# Patient Record
Sex: MALE | Race: WHITE | NOT HISPANIC OR LATINO | Employment: FULL TIME | URBAN - METROPOLITAN AREA
[De-identification: names, ages, dates, MRNs, and addresses within clinical notes are randomized per-mention and may not be internally consistent; named-entity substitution may affect disease eponyms.]

---

## 2019-07-16 ENCOUNTER — TELEPHONE (OUTPATIENT)
Dept: PAIN MEDICINE | Facility: CLINIC | Age: 49
End: 2019-07-16
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
## 2019-07-18 ENCOUNTER — CONSULT (OUTPATIENT)
Dept: PAIN MEDICINE | Facility: CLINIC | Age: 49
End: 2019-07-18
Payer: COMMERCIAL

## 2019-07-18 VITALS
HEART RATE: 52 BPM | WEIGHT: 281 LBS | BODY MASS INDEX: 34.94 KG/M2 | DIASTOLIC BLOOD PRESSURE: 74 MMHG | HEIGHT: 75 IN | SYSTOLIC BLOOD PRESSURE: 120 MMHG

## 2019-07-18 DIAGNOSIS — M54.2 NECK PAIN: ICD-10-CM

## 2019-07-18 DIAGNOSIS — F07.81 POST CONCUSSIVE SYNDROME: ICD-10-CM

## 2019-07-18 DIAGNOSIS — M54.12 CERVICAL RADICULOPATHY: Primary | ICD-10-CM

## 2019-07-18 PROCEDURE — 99204 OFFICE O/P NEW MOD 45 MIN: CPT | Performed by: ANESTHESIOLOGY

## 2019-07-18 RX ORDER — PREDNISONE 10 MG/1
TABLET ORAL
Qty: 21 TABLET | Refills: 0 | Status: SHIPPED | OUTPATIENT
Start: 2019-07-18 | End: 2019-08-22
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
## 2019-07-25 ENCOUNTER — TELEPHONE (OUTPATIENT)
Dept: PAIN MEDICINE | Facility: CLINIC | Age: 49
End: 2019-07-25
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
## 2019-07-25 NOTE — TELEPHONE ENCOUNTER
Patient has made an appointment for an MRI on 8/2/19 at [facility]  Please ensure the authorization is put through his AUTO

## 2019-08-02 ENCOUNTER — HOSPITAL ENCOUNTER (OUTPATIENT)
Dept: MRI IMAGING | Facility: HOSPITAL | Age: 49
Discharge: HOME | End: 2019-08-02
Attending: ANESTHESIOLOGY
Payer: COMMERCIAL

## 2019-08-02 DIAGNOSIS — M54.12 CERVICAL RADICULOPATHY: ICD-10-CM

## 2019-08-02 PROCEDURE — 72141 MRI NECK SPINE W/O DYE: CPT

## 2019-08-12 ENCOUNTER — TELEPHONE (OUTPATIENT)
Dept: PAIN MEDICINE | Facility: CLINIC | Age: 49
End: 2019-08-12

## 2019-08-12 NOTE — TELEPHONE ENCOUNTER
----- Message from Eufemia Adler DO sent at 8/10/2019  2:40 PM EDT -----  MRI cervical spine:Small left foraminal disc protrusion C5-6    Mild bilateral foraminal stenosis, small left foraminal disc protrusion C6-7    If pain shoots pain is still interfering with daily living activities would recommend cervical epidural steroid injection x2 diagnosis cervical disc herniation, cervical radiculitis

## 2019-08-22 ENCOUNTER — TELEPHONE (OUTPATIENT)
Dept: PAIN MEDICINE | Facility: CLINIC | Age: 49
End: 2019-08-22

## 2019-08-22 ENCOUNTER — OFFICE VISIT (OUTPATIENT)
Dept: PAIN MEDICINE | Facility: CLINIC | Age: 49
End: 2019-08-22
Payer: COMMERCIAL

## 2019-08-22 VITALS
WEIGHT: 284 LBS | DIASTOLIC BLOOD PRESSURE: 82 MMHG | SYSTOLIC BLOOD PRESSURE: 128 MMHG | HEART RATE: 54 BPM | HEIGHT: 75 IN | BODY MASS INDEX: 35.31 KG/M2

## 2019-08-22 DIAGNOSIS — M54.2 NECK PAIN: Primary | ICD-10-CM

## 2019-08-22 DIAGNOSIS — M50.222 HERNIATED NUCLEUS PULPOSUS, C5-6 LEFT: ICD-10-CM

## 2019-08-22 DIAGNOSIS — M50.223 HERNIATED NUCLEUS PULPOSUS, C6-7 LEFT: ICD-10-CM

## 2019-08-22 DIAGNOSIS — M54.12 CERVICAL RADICULOPATHY: ICD-10-CM

## 2019-08-22 PROCEDURE — 99214 OFFICE O/P EST MOD 30 MIN: CPT | Performed by: NURSE PRACTITIONER

## 2019-08-22 NOTE — TELEPHONE ENCOUNTER
Call from patient  # [phone]  Fax# [phone] Attention: Marco Antonio Lepe    Patient is requesting documentation to provide to his  regarding the followin-Pt has 2 herniated disc's and the nature of it  2-Course of injections for the neck were recommended, if that fails surgery would be the next course of action

## 2019-08-22 NOTE — PATIENT INSTRUCTIONS
Epidural Steroid Injection   AMBULATORY CARE:   What you need to know about an epidural steroid injection (HOA):  An HOA is a procedure to inject steroid medicine into the epidural space  The epidural space is between your spinal cord and vertebrae  Steroids reduce inflammation and fluid buildup in your spine that may be causing pain  You may be given pain medicine along with the steroids  How to prepare for an HOA:  Your healthcare provider will talk to you about how to prepare for your procedure  He will tell you what medicines to take or not take on the day of your procedure  You may need to stop taking blood thinners or other medicines several days before your procedure  You may need to adjust any diabetes medicine you take on the day of your procedure  Steroid medicine can increase your blood sugar level  What will happen during an HOA:   · You will be given medicine to numb the procedure area  You will be awake for the procedure, but you will not feel pain  You may also be given medicine to help you relax during the procedure  Contrast liquid will be used to help your healthcare provider see the area better  Tell the healthcare provider if you have ever had an allergic reaction to contrast liquid  · Your healthcare provider may place the needle into your neck area, middle of your back, or tailbone area  He may inject the medicine next to the nerves that are causing your pain  He may instead inject the medicine into a larger area of the epidural space  This helps the medicine spread to more nerves  Your healthcare provider will use a fluoroscope to help guide the needle to the right place  A fluoroscope is a type of x-ray  After the procedure, a bandage will be placed over the injection site to prevent infection  Risks of an HOA:  You may have temporary or permanent nerve damage or paralysis  You may have bleeding or develop a serious infection, such as meningitis (swelling of the brain coverings)  An abscess may also develop  You may need surgery to fix the abscess  You may have a seizure, anxiety, or trouble sleeping  If you are a man, you may have temporary erectile dysfunction (not able to have an erection)  Care for your wound as directed: You may remove the bandage before you go to bed the day of your procedure  You may take a shower, but do not take a bath for at least 24 hours  Self-care:   · Do not drive,  use machines, or do strenuous activity for 24 hours after your procedure or as directed  · Continue other treatments  as directed  Steroid injections alone will not control your pain  The injections are meant to be used with other treatments, such as physical therapy  Seek care immediately if:   · Blood soaks through your bandage  · Your wound is red, swollen, or draining pus  · You have a fever or chills, severe back pain, and the procedure area is sensitive to the touch  · You have a seizure  · You have trouble moving your legs  · You have weakness or numbness in your legs  · You cannot control when you urinate or have a bowel movement  Contact your healthcare provider if:   · You have nausea or are vomiting  · Your face or neck is red for a few days and you feel warm  · You have more pain than you had before the procedure  · You have swelling in your hands or feet  · You have questions or concerns about your condition or care  Follow up with your healthcare provider as directed:  Write down your questions so you remember to ask them during your visits  © 2017 [employer] Information is for End User's use only and may not be sold, redistributed or otherwise used for commercial purposes  All illustrations and images included in CareNotes® are the copyrighted property of [employer]  or [employer] Thacker  The above information is an  only  It is not intended as medical advice for individual conditions or treatments  Talk to your doctor, nurse or pharmacist before following any medical regimen to see if it is safe and effective for you      MEDICATIONS: Juanid GOMEZ

## 2019-08-22 NOTE — TELEPHONE ENCOUNTER
LMOM to cb  Provided cb number and office hours  Will advise pt, this office will provide information available in the pt's chart via the

## 2019-08-22 NOTE — PROGRESS NOTES
Assessment:  1  Neck pain    2  Herniated nucleus pulposus, C5-6 left    3  Herniated nucleus pulposus, C6-7 left    4  Cervical radiculopathy        Plan:  While the patient was in the office today, I did have a thorough conversation with the patient regarding his current pain symptoms, recent MRI findings, and treatment plan options  I explained to the patient that at this point we feel there is definitely significant inflammatory component and that he may benefit from a cervical epidural steroid injection directed towards the left with Dr Yamileth Reno  I did explain to the patient that depending on the results of the injection, it may need to be repeated, however, we would see how he does from the 1st injection and proceed from there as appropriate  Complete risks and benefits including bleeding, infection, tissue reaction, nerve injury and allergic reaction were discussed  The approach was demonstrated using models and literature was provided  However, the patient want to take some time to review the literature that I provided today and discussed with his wife before he made any decisions regarding an injection, but understands that if he decides he would like to proceed with the injection in the future, he should call our office and we can get him scheduled  The patient was agreeable and verbalized an understanding  I also discussed with the patient that another option would be to consider an neuropathic medications such as gabapentin, Lyrica, Cymbalta, and or Nortriptyline  I did briefly review with the patient the kinds of medication these medicines are, how they were, and how they require a titration process that is specific to each individual   However, the patient reported he want to think about the medications as well and is not sure that medication is an option he would like to pursue at this time      I discussed with the patient that at this point time he can followup with our office on an as-needed basis  I did review the patient that if his pain symptoms should change, worsen, and/or if he would experience any new symptoms he would like to be evaluated for, he should give our office a call  The patient was agreeable and verbalized an understanding  I attest that I have spent at least 25 minutes face to face with the patient and that at least 50% of the time was educating and/or discussing the patient's symptoms and treatment plan options until the patient was satisfied with the plan  History of Present Illness: The patient is a 51 y o  male last seen on 7/18/19 who presents for a follow up office visit in regards to neck and left arm pain secondary to herniated discs with radiculopathy  The patient currently reports that since his last office visit his left-sided greater than right neck and upper extremity radicular symptoms and numbness has continued to worsen and presents today to discuss the results of his most recent MRI of the cervical spine which did show too small disc herniations push more towards left at C5-C6 and C6-C7 levels with mild bilateral foraminal stenosis noted  Since his last office visit the patient has also proceeded with the ordered titrating dose of oral prednisone with some improvement while he was on the prednisone, however, there has not been any significant overall change or improvement  He presents today to discuss his treatment options  I have personally reviewed and/or updated the patient's past medical history, past surgical history, family history, social history, current medications, allergies, and vital signs today  Review of Systems:    Review of Systems   Respiratory: Negative for shortness of breath  Cardiovascular: Negative for chest pain  Gastrointestinal: Negative for constipation, diarrhea, nausea and vomiting  Musculoskeletal: Positive for joint swelling (joint stiffness)   Negative for arthralgias, gait problem and myalgias  Skin: Negative for rash  Neurological: Positive for dizziness  Negative for seizures and weakness  All other systems reviewed and are negative  History reviewed  No pertinent past medical history  History reviewed  No pertinent surgical history  History reviewed  No pertinent family history  Social History     Occupational History    Not on file   Tobacco Use    Smoking status: Never Smoker    Smokeless tobacco: Never Used   Substance and Sexual Activity    Alcohol use: Never     Frequency: Never    Drug use: Never    Sexual activity: Never       No current outpatient medications on file  No Known Allergies    Physical Exam:    /82 (BP Location: Right arm, Patient Position: Sitting, Cuff Size: Large)   Pulse (!) 54   Ht 6' 3" (1 905 m)   Wt 129 kg (284 lb)   BMI 35 50 kg/m²     Constitutional:normal, well developed, well nourished, alert, in no distress and non-toxic and no overt pain behavior  Eyes:anicteric  HEENT:grossly intact  Neck:supple, symmetric, trachea midline and no masses   Pulmonary:even and unlabored  Cardiovascular:No edema or pitting edema present  Skin:Normal without rashes or lesions and well hydrated  Psychiatric:Mood and affect appropriate  Neurologic:Cranial Nerves II-XII grossly intact  Musculoskeletal:normal      Imaging  No orders to display         No orders of the defined types were placed in this encounter

## 2019-08-26 ENCOUNTER — TELEPHONE (OUTPATIENT)
Dept: PAIN MEDICINE | Facility: MEDICAL CENTER | Age: 49
End: 2019-08-26

## 2019-08-26 NOTE — TELEPHONE ENCOUNTER
Saira Brown 34 minutes ago (8:02 AM)         Pt is calling requesting to schedule an injection     Pt can be reached at [phone]            Has pt called back for his results and obtained info on the procedure? Then I will call to schedule pt

## 2019-08-28 NOTE — TELEPHONE ENCOUNTER
S/w patient, wrong fax # prior, however pt confirmed prior fax # is a secure line, no longer used ([facility])    Correct Fax# [phone] Attention: Mercedes Davis

## 2019-08-28 NOTE — TELEPHONE ENCOUNTER
Pt scheduled tentatively the 1st procedure for 9/6/2019, pt will wait to scheduled repeat as he might need to change the 1st date  Pt aware to verify his Auto benefits as well as his [employer] provided proc/dx codes to pt to see if any co-insurance/copays apply

## 2019-08-29 NOTE — TELEPHONE ENCOUNTER
rcvd call from pt to change procedure to later the same day, advised pt we are booked at the moment if anything opens will call pt  Pt was offered of days but decided to keep the original date of 9/6/2016  Pt check if we spoke to his insurance advised pt per Adilia Ivory with [employer] his claim was open and Brandi Abraham was still pending authorization  Advised to call his insurances to verify benefits

## 2019-08-29 NOTE — TELEPHONE ENCOUNTER
Spoke with patient and advised that records will be sent to fax # provided   Original call was routed to clinical pool

## 2019-09-06 ENCOUNTER — HOSPITAL ENCOUNTER (OUTPATIENT)
Dept: RADIOLOGY | Facility: CLINIC | Age: 49
Discharge: HOME | End: 2019-09-06
Payer: COMMERCIAL

## 2019-09-06 VITALS
RESPIRATION RATE: 20 BRPM | TEMPERATURE: 98.6 F | HEART RATE: 52 BPM | SYSTOLIC BLOOD PRESSURE: 127 MMHG | OXYGEN SATURATION: 96 % | DIASTOLIC BLOOD PRESSURE: 78 MMHG

## 2019-09-06 DIAGNOSIS — M54.12 CERVICAL RADICULITIS: ICD-10-CM

## 2019-09-06 DIAGNOSIS — M50.123 CERVICAL DISC DISORDER AT C6-C7 LEVEL WITH RADICULOPATHY: ICD-10-CM

## 2019-09-06 PROCEDURE — 62321 NJX INTERLAMINAR CRV/THRC: CPT | Performed by: ANESTHESIOLOGY

## 2019-09-06 RX ORDER — LIDOCAINE HYDROCHLORIDE 10 MG/ML
5 INJECTION, SOLUTION EPIDURAL; INFILTRATION; INTRACAUDAL; PERINEURAL ONCE
Status: COMPLETED | OUTPATIENT
Start: 2019-09-06 | End: 2019-09-06

## 2019-09-06 RX ORDER — METHYLPREDNISOLONE ACETATE 80 MG/ML
160 INJECTION, SUSPENSION INTRA-ARTICULAR; INTRALESIONAL; INTRAMUSCULAR; PARENTERAL; SOFT TISSUE ONCE
Status: COMPLETED | OUTPATIENT
Start: 2019-09-06 | End: 2019-09-06

## 2019-09-06 RX ADMIN — IOHEXOL 1 ML: 300 INJECTION, SOLUTION INTRAVENOUS at 08:17

## 2019-09-06 RX ADMIN — LIDOCAINE HYDROCHLORIDE 3 ML: 10 INJECTION, SOLUTION EPIDURAL; INFILTRATION; INTRACAUDAL; PERINEURAL at 08:16

## 2019-09-06 RX ADMIN — METHYLPREDNISOLONE ACETATE 160 MG: 80 INJECTION, SUSPENSION INTRA-ARTICULAR; INTRALESIONAL; INTRAMUSCULAR; SOFT TISSUE at 08:17

## 2019-09-06 NOTE — DISCHARGE INSTRUCTIONS
Epidural Steroid Injection   WHAT YOU NEED TO KNOW:   An epidural steroid injection (HOA) is a procedure to inject steroid medicine into the epidural space  The epidural space is between your spinal cord and vertebrae  Steroids reduce inflammation and fluid buildup in your spine that may be causing pain  You may be given pain medicine along with the steroids  ACTIVITY  · Do not drive or operate machinery today  · No strenuous activity today - bending, lifting, etc   · You may resume normal activites starting tomorrow - start slowly and as tolerated  · You may shower today, but no tub baths or hot tubs  · You may have numbness for several hours from the local anesthetic  Please use caution and common sense, especially with weight-bearing activities  CARE OF THE INJECTION SITE  · If you have soreness or pain, apply ice to the area today (20 minutes on/20 minutes off)  · Starting tomorrow, you may use warm, moist heat or ice if needed  · You may have an increase or change in your discomfort for 36-48 hours after your treatment  · Apply ice and continue with any pain medication you have been prescribed  · [facility]ify [facility] if you have any of the following: redness, drainage, swelling, headache, stiff neck or fever above 100°F     SPECIAL INSTRUCTIONS  · Our office will contact you in approximately 7 days for a progress report  MEDICATIONS  · Continue to take all routine medications  · Our office may have instructed you to hold some medications  If you have a problem specifically related to your procedure, please call our office at [phone]  Problems not related to your procedure should be directed to your primary care physician

## 2019-09-06 NOTE — H&P
History of Present Illness: The patient is a 51 y o  male who presents with complaints of neck and arm pain    Patient Active Problem List   Diagnosis    Neck pain    Herniated nucleus pulposus, C5-6 left    Herniated nucleus pulposus, C6-7 left    Cervical radiculopathy       No past medical history on file  No past surgical history on file  No current outpatient medications on file  Current Facility-Administered Medications:     iohexol (OMNIPAQUE) 300 mg/mL injection 50 mL, 50 mL, Epidural, Once, Rick Slater DO    lidocaine (PF) (XYLOCAINE-MPF) 1 % injection 5 mL, 5 mL, Other, Once, Rick Slater DO    methylPREDNISolone acetate (DEPO-MEDROL) injection 160 mg, 160 mg, Epidural, Once, Rick Slater DO    No Known Allergies    Physical Exam:   General: Awake, Alert, Oriented x 3, Mood and affect appropriate  Respiratory: Respirations even and unlabored  Cardiovascular: Peripheral pulses intact; no edema  Musculoskeletal Exam:  Decreased range of motion cervical spine    ASA Score: II         Assessment:   1  Cervical disc disorder at C6-C7 level with radiculopathy    2   Cervical radiculitis        Plan: WHIT #1

## 2019-09-13 ENCOUNTER — TELEPHONE (OUTPATIENT)
Dept: PAIN MEDICINE | Facility: CLINIC | Age: 49
End: 2019-09-13

## 2019-09-16 ENCOUNTER — TELEPHONE (OUTPATIENT)
Dept: PAIN MEDICINE | Facility: CLINIC | Age: 49
End: 2019-09-16

## 2019-10-24 ENCOUNTER — HOSPITAL ENCOUNTER (OUTPATIENT)
Dept: RADIOLOGY | Facility: CLINIC | Age: 49
Discharge: HOME | End: 2019-10-24
Attending: ANESTHESIOLOGY | Admitting: ANESTHESIOLOGY
Payer: COMMERCIAL

## 2019-10-24 VITALS
HEART RATE: 68 BPM | SYSTOLIC BLOOD PRESSURE: 143 MMHG | TEMPERATURE: 98.3 F | OXYGEN SATURATION: 95 % | DIASTOLIC BLOOD PRESSURE: 84 MMHG | RESPIRATION RATE: 18 BRPM

## 2019-10-24 DIAGNOSIS — M54.12 CERVICAL RADICULITIS: ICD-10-CM

## 2019-10-24 DIAGNOSIS — M50.123 DISORDER OF INTERVERTEBRAL DISC AT C6-C7 LEVEL WITH RADICULOPATHY: ICD-10-CM

## 2019-10-24 PROCEDURE — 62321 NJX INTERLAMINAR CRV/THRC: CPT | Performed by: ANESTHESIOLOGY

## 2019-10-24 RX ORDER — LIDOCAINE HYDROCHLORIDE 10 MG/ML
5 INJECTION, SOLUTION EPIDURAL; INFILTRATION; INTRACAUDAL; PERINEURAL ONCE
Status: COMPLETED | OUTPATIENT
Start: 2019-10-24 | End: 2019-10-24

## 2019-10-24 RX ORDER — METHYLPREDNISOLONE ACETATE 80 MG/ML
160 INJECTION, SUSPENSION INTRA-ARTICULAR; INTRALESIONAL; INTRAMUSCULAR; PARENTERAL; SOFT TISSUE ONCE
Status: COMPLETED | OUTPATIENT
Start: 2019-10-24 | End: 2019-10-24

## 2019-10-24 RX ADMIN — LIDOCAINE HYDROCHLORIDE 3 ML: 10 INJECTION, SOLUTION EPIDURAL; INFILTRATION; INTRACAUDAL; PERINEURAL at 10:39

## 2019-10-24 RX ADMIN — IOHEXOL 1 ML: 300 INJECTION, SOLUTION INTRAVENOUS at 10:41

## 2019-10-24 RX ADMIN — METHYLPREDNISOLONE ACETATE 160 MG: 80 INJECTION, SUSPENSION INTRA-ARTICULAR; INTRALESIONAL; INTRAMUSCULAR; SOFT TISSUE at 10:41

## 2019-10-24 NOTE — DISCHARGE INSTR - LAB
Epidural Steroid Injection   WHAT YOU NEED TO KNOW:   An epidural steroid injection (HOA) is a procedure to inject steroid medicine into the epidural space  The epidural space is between your spinal cord and vertebrae  Steroids reduce inflammation and fluid buildup in your spine that may be causing pain  You may be given pain medicine along with the steroids  ACTIVITY  · Do not drive or operate machinery today  · No strenuous activity today - bending, lifting, etc   · You may resume normal activites starting tomorrow - start slowly and as tolerated  · You may shower today, but no tub baths or hot tubs  · You may have numbness for several hours from the local anesthetic  Please use caution and common sense, especially with weight-bearing activities  CARE OF THE INJECTION SITE  · If you have soreness or pain, apply ice to the area today (20 minutes on/20 minutes off)  · Starting tomorrow, you may use warm, moist heat or ice if needed  · You may have an increase or change in your discomfort for 36-48 hours after your treatment  · Apply ice and continue with any pain medication you have been prescribed  · Notify [facility] if you have any of the following: redness, drainage, swelling, headache, stiff neck or fever above 100°F     SPECIAL INSTRUCTIONS  · Our office will contact you in approximately 7 days for a progress report  MEDICATIONS  · Continue to take all routine medications  · Our office may have instructed you to hold some medications  If you have a problem specifically related to your procedure, please call our office at [phone]  Problems not related to your procedure should be directed to your primary care physician

## 2019-10-24 NOTE — H&P
History of Present Illness: The patient is a 51 y o  male who presents with complaints of neck pain  Patient Active Problem List   Diagnosis    Neck pain    Herniated nucleus pulposus, C5-6 left    Herniated nucleus pulposus, C6-7 left    Cervical radiculitis    Cervical disc disorder at C6-C7 level with radiculopathy       No past medical history on file  No past surgical history on file  No current outpatient medications on file  Current Facility-Administered Medications:     iohexol (OMNIPAQUE) 300 mg/mL injection 50 mL, 50 mL, Epidural, Once, Rick Slater DO    lidocaine (PF) (XYLOCAINE-MPF) 1 % injection 5 mL, 5 mL, Other, Once, Rick Slater DO    methylPREDNISolone acetate (DEPO-MEDROL) injection 160 mg, 160 mg, Epidural, Once, Brisa Samayoa DO    No Known Allergies    Physical Exam:   Vitals:    10/24/19 1032   BP: 132/82   Pulse: 68   Resp: 18   Temp: 98 3 °F (36 8 °C)   SpO2: 96%     General: Awake, Alert, Oriented x 3, Mood and affect appropriate  Respiratory: Respirations even and unlabored  Cardiovascular: Peripheral pulses intact; no edema  Musculoskeletal Exam:  Decreased range of motion cervical spine    ASA Score: II    Patient/Chart Verification  Patient ID Verified: Verbal  ID Band Applied: No  Consents Confirmed: Procedural, To be obtained in the Pre-Procedure area  H&P( within 30 days) Verified: To be obtained in the Pre-Procedure area  Allergies Reviewed: Yes  Anticoag/NSAID held?: No  Currently on antibiotics?: No    Assessment:   1  Disorder of intervertebral disc at C6-C7 level with radiculopathy    2   Cervical radiculitis        Plan: WHIT #2

## 2019-10-31 ENCOUNTER — TELEPHONE (OUTPATIENT)
Dept: PAIN MEDICINE | Facility: CLINIC | Age: 49
End: 2019-10-31

## 2019-12-06 NOTE — TELEPHONE ENCOUNTER
Please call patient-his last procedure was in Oct?  How long has he had headache? Did he try calling before?

## 2019-12-06 NOTE — TELEPHONE ENCOUNTER
S/w the patient today and he stated since the WHIT he has had a mostly constant HA that is mostly in the front of his forehead and radiates up to his hairline  Inquired about previously having attempting to contact him and he stated he is super busy with work and his career and he has not been able to [place] [facility]  Inquired to see if it is positional and he stated that he does feel better when he lays down to sleep but it increases during the day and it is starting to affect his ADL's  He stated it feels like a brain freeze  It is relieved with taking OTC Nsaid's  He is also c/o pain in LB toward the bottom of his spine  S/w SL and he would like him to start a medrol dose pack and move up his appointment c/ DG for evaluation  S/w the patient and reviewed and he was agreeable in taking the medrol dose pack and instructions reviewed  Notified [facility] and medrol dose pack called in

## 2019-12-06 NOTE — TELEPHONE ENCOUNTER
Patient states she or he has 0% of improvement & 5/10 pain level  Patient has  constant headache, spike in the base of spine pain that he didn't have & he states is intoloerable   He would like to discuss his status with a nurse; please advise, hardy    Call back# [phone]

## 2019-12-06 NOTE — TELEPHONE ENCOUNTER
Please call patient and change next ovs to an earlier one c/ DG per SL  Thanks  May double book per SL

## 2020-02-17 ENCOUNTER — TELEPHONE (OUTPATIENT)
Dept: PAIN MEDICINE | Facility: CLINIC | Age: 50
End: 2020-02-17

## 2020-02-17 DIAGNOSIS — M50.223 HERNIATED NUCLEUS PULPOSUS, C6-7 LEFT: ICD-10-CM

## 2020-02-17 DIAGNOSIS — M50.222 HERNIATED NUCLEUS PULPOSUS, C5-6 LEFT: ICD-10-CM

## 2020-02-17 DIAGNOSIS — M54.12 CERVICAL RADICULITIS: ICD-10-CM

## 2020-02-17 DIAGNOSIS — M54.2 NECK PAIN: Primary | ICD-10-CM

## 2020-02-17 NOTE — TELEPHONE ENCOUNTER
Patient called in to schedule his next injection with Oni   Please advise thx      Patient contact # [phone]

## 2020-02-17 NOTE — TELEPHONE ENCOUNTER
Can you please call the patient and see what he is talking about? Did the previous injections help his pain? How much did it help? Where is his pain?  Etc?

## 2020-02-19 NOTE — TELEPHONE ENCOUNTER
Can you please call the patient and schedule him for a repeat WHIT to the Left with Dr Bobo Moffett  I put the order in  He will then need a 4 week f/u OV with me for 15 mins to discuss options and re-group after the injection  Thank you

## 2020-02-19 NOTE — TELEPHONE ENCOUNTER
Proc carol #3 scheduled for 3/2/2020, with a 4 wk f/u post on 4/2/2020 for re-eval, reviewed all pre-procedural instructions with pt and pt verbalized understanding

## 2020-02-19 NOTE — TELEPHONE ENCOUNTER
S/w pt, he said that he was looking to schedule another WHIT, last one Oct 2019  Pt said the pain has come back and the injection has completely worn off  Pt said his pain is in the same place, neck L>R, L arm and L hand is numb  Pt said the pain is getting worse than ever, starting as soon as he wakes up  RN asked pt is the injections helped in the past, pt said they do temporarily  He wants to do an injection and down the line talk about more permanent fixes  OK to schedule?

## 2020-03-02 ENCOUNTER — HOSPITAL ENCOUNTER (OUTPATIENT)
Dept: RADIOLOGY | Facility: CLINIC | Age: 50
Discharge: HOME | End: 2020-03-02
Admitting: ANESTHESIOLOGY
Payer: COMMERCIAL

## 2020-03-02 VITALS
DIASTOLIC BLOOD PRESSURE: 88 MMHG | HEART RATE: 66 BPM | RESPIRATION RATE: 22 BRPM | OXYGEN SATURATION: 95 % | SYSTOLIC BLOOD PRESSURE: 145 MMHG | TEMPERATURE: 97.8 F

## 2020-03-02 DIAGNOSIS — M50.223 HERNIATED NUCLEUS PULPOSUS, C6-7 LEFT: ICD-10-CM

## 2020-03-02 DIAGNOSIS — M54.2 NECK PAIN: ICD-10-CM

## 2020-03-02 DIAGNOSIS — M50.222 HERNIATED NUCLEUS PULPOSUS, C5-6 LEFT: ICD-10-CM

## 2020-03-02 DIAGNOSIS — M54.12 CERVICAL RADICULITIS: ICD-10-CM

## 2020-03-02 PROCEDURE — 62321 NJX INTERLAMINAR CRV/THRC: CPT | Performed by: ANESTHESIOLOGY

## 2020-03-02 RX ORDER — METHYLPREDNISOLONE ACETATE 80 MG/ML
160 INJECTION, SUSPENSION INTRA-ARTICULAR; INTRALESIONAL; INTRAMUSCULAR; PARENTERAL; SOFT TISSUE ONCE
Status: COMPLETED | OUTPATIENT
Start: 2020-03-02 | End: 2020-03-02

## 2020-03-02 RX ORDER — LIDOCAINE HYDROCHLORIDE 10 MG/ML
5 INJECTION, SOLUTION EPIDURAL; INFILTRATION; INTRACAUDAL; PERINEURAL ONCE
Status: COMPLETED | OUTPATIENT
Start: 2020-03-02 | End: 2020-03-02

## 2020-03-02 RX ADMIN — METHYLPREDNISOLONE ACETATE 160 MG: 80 INJECTION, SUSPENSION INTRA-ARTICULAR; INTRALESIONAL; INTRAMUSCULAR; SOFT TISSUE at 09:35

## 2020-03-02 RX ADMIN — LIDOCAINE HYDROCHLORIDE 3 ML: 10 INJECTION, SOLUTION EPIDURAL; INFILTRATION; INTRACAUDAL; PERINEURAL at 09:34

## 2020-03-02 RX ADMIN — IOHEXOL 1 ML: 300 INJECTION, SOLUTION INTRAVENOUS at 09:35

## 2020-03-02 NOTE — DISCHARGE INSTRUCTIONS
Epidural Steroid Injection   WHAT YOU NEED TO KNOW:   An epidural steroid injection (HOA) is a procedure to inject steroid medicine into the epidural space  The epidural space is between your spinal cord and vertebrae  Steroids reduce inflammation and fluid buildup in your spine that may be causing pain  You may be given pain medicine along with the steroids  ACTIVITY  · Do not drive or operate machinery today  · No strenuous activity today - bending, lifting, etc   · You may resume normal activites starting tomorrow - start slowly and as tolerated  · You may shower today, but no tub baths or hot tubs  · You may have numbness for several hours from the local anesthetic  Please use caution and common sense, especially with weight-bearing activities  CARE OF THE INJECTION SITE  · If you have soreness or pain, apply ice to the area today (20 minutes on/20 minutes off)  · Starting tomorrow, you may use warm, moist heat or ice if needed  · You may have an increase or change in your discomfort for 36-48 hours after your treatment  · Apply ice and continue with any pain medication you have been prescribed  · Notify [facility] if you have any of the following: redness, drainage, swelling, headache, stiff neck or fever above 100°F     SPECIAL INSTRUCTIONS  · Our office will contact you in approximately 7 days for a progress report  MEDICATIONS  · Continue to take all routine medications  · Our office may have instructed you to hold some medications  If you have a problem specifically related to your procedure, please call our office at [phone]  Problems not related to your procedure should be directed to your primary care physician

## 2020-03-02 NOTE — H&P
History of Present Illness: The patient is a 47 y o  male who presents with complaints of neck and arm pain  Patient Active Problem List   Diagnosis    Neck pain    Herniated nucleus pulposus, C5-6 left    Herniated nucleus pulposus, C6-7 left    Cervical radiculitis    Disorder of intervertebral disc at C6-C7 level with radiculopathy       No past medical history on file  No past surgical history on file  No current outpatient medications on file  Current Facility-Administered Medications:     iohexol (OMNIPAQUE) 300 mg/mL injection 50 mL, 50 mL, Epidural, Once, Rick Slater DO    lidocaine (PF) (XYLOCAINE-MPF) 1 % injection 5 mL, 5 mL, Other, Once, Rick Slater DO    methylPREDNISolone acetate (DEPO-MEDROL) injection 160 mg, 160 mg, Epidural, Once, Rick Slater DO    No Known Allergies    Physical Exam:   General: Awake, Alert, Oriented x 3, Mood and affect appropriate  Respiratory: Respirations even and unlabored  Cardiovascular: Peripheral pulses intact; no edema  Musculoskeletal Exam:  Decreased range of motion cervical spine    ASA Score: II         Assessment:   1  Neck pain    2  Cervical radiculitis    3  Herniated nucleus pulposus, C5-6 left    4   Herniated nucleus pulposus, C6-7 left        Plan: WHIT

## 2020-03-09 ENCOUNTER — TELEPHONE (OUTPATIENT)
Dept: PAIN MEDICINE | Facility: CLINIC | Age: 50
End: 2020-03-09

## 2021-06-16 ENCOUNTER — TELEPHONE (OUTPATIENT)
Dept: PAIN MEDICINE | Facility: CLINIC | Age: 51
End: 2021-06-16

## 2021-06-16 NOTE — TELEPHONE ENCOUNTER
Pt is requesting an appt with dr quesada instead of ROYA for his f/u neck pain  Please advise if OK to schedule with SL

## 2022-07-07 NOTE — TELEPHONE ENCOUNTER
1st attempt  Lm to cb with % improvement and pain level 
2nd attempt  Lm to cb with % improvement and pain level 
Aware-is he scheduled
L/m for pt to rtc to [facility] 
L/m to let pt know we have a 3:30 opening on 10/10 
No - please advise    Repeat?
Pt called back to get additional day/times to coordinate with his ride and work schedule  Pt will call back as he might need to take off one day 
Pt called back to schedule for 9/30/2019, l/m for pt to rtc to [facility]  as we do not have procedures on 9/30/19 
Pt rtc today looking for appts for 10/17 rtc to pt to state yes and to rtc to schedule 
Pt scheduled for 10/24/2019, reviewed all pre-procedural instructions 
S/w pt and pt states he will need to coordinate a  and get back to us to schedule procedure 
Tender spot where the injection site was  No tremendous improvement  He thinks he will need another improvement  Still feels the same  Pain level is intolerable towards the end of the day   Which is 10
Yes,please repeat
MODERATE